# Patient Record
Sex: MALE | Race: WHITE
[De-identification: names, ages, dates, MRNs, and addresses within clinical notes are randomized per-mention and may not be internally consistent; named-entity substitution may affect disease eponyms.]

---

## 2021-04-11 ENCOUNTER — HOSPITAL ENCOUNTER (EMERGENCY)
Dept: HOSPITAL 47 - EC | Age: 66
Discharge: HOME | End: 2021-04-11
Payer: MEDICARE

## 2021-04-11 VITALS — RESPIRATION RATE: 18 BRPM

## 2021-04-11 VITALS — TEMPERATURE: 98.2 F | HEART RATE: 102 BPM | DIASTOLIC BLOOD PRESSURE: 67 MMHG | SYSTOLIC BLOOD PRESSURE: 100 MMHG

## 2021-04-11 DIAGNOSIS — J12.82: ICD-10-CM

## 2021-04-11 DIAGNOSIS — U07.1: Primary | ICD-10-CM

## 2021-04-11 DIAGNOSIS — I48.91: ICD-10-CM

## 2021-04-11 PROCEDURE — 87635 SARS-COV-2 COVID-19 AMP PRB: CPT

## 2021-04-11 PROCEDURE — 99285 EMERGENCY DEPT VISIT HI MDM: CPT

## 2021-04-11 PROCEDURE — 96365 THER/PROPH/DIAG IV INF INIT: CPT

## 2021-04-11 PROCEDURE — 71045 X-RAY EXAM CHEST 1 VIEW: CPT

## 2021-04-11 NOTE — ED
General Adult HPI





<Ceasar Schneider - Last Filed: 04/11/21 18:23>





<Hue Wilkes - Last Filed: 04/11/21 21:18>





- General


Stated complaint: Fever, Covid +


Time Seen by Provider: 04/11/21 18:20





- History of Present Illness


Initial comments: 





Patient was seen as a medical screening for advanced triage purposes: 

65-year-old male presents to the emergency department for COVID symptoms x 5 

days.  Patient reports he has fatigue.  He has also been short of breath.  

Patient states he tested positive at home earlier this week. (Ceasar Schneider)


65-year-old male patient presenting to the emergency department today for fever,

decreased appetite, difficulty sleeping.  States he tested positive for COVID-19

on 04/07/2021.  States his been having symptoms for the last 5 days.  States he 

does have a mild intermittent cough and occasional shortness of breath.  Denies 

any nausea or vomiting.  Denies diarrhea.  Denies any rash.  The past medical 

history significant for atrial fibrillation. Patient denies any recent chest 

pain, abdominal pain, constipation, back pain, numbness, tingling, dizziness, 

hematuria, dysuria, urinary urgency, urinary frequency, headache, visual 

changes, or any other complaints. (Hue Wilkes)





- Related Data


                                    Allergies











Allergy/AdvReac Type Severity Reaction Status Date / Time


 


ampicillin Allergy  Swelling Verified 04/11/21 18:25














Review of Systems


ROS Other: All systems not noted in ROS Statement are negative.





<Ceasar Schneider - Last Filed: 04/11/21 18:23>


ROS Other: All systems not noted in ROS Statement are negative.





<Hue Wilkes - Last Filed: 04/11/21 21:18>


ROS Statement: 


Those systems with pertinent positive or pertinent negative responses have been 

documented in the HPI.








General Exam


General appearance: alert, in no apparent distress, other


Eye exam: Present: normal appearance, PERRL, EOMI.  Absent: scleral icterus, 

conjunctival injection, periorbital swelling


ENT exam: Present: normal exam, normal oropharynx, mucous membranes moist


Respiratory exam: Present: normal lung sounds bilaterally.  Absent: respiratory 

distress, wheezes, rales, rhonchi, stridor


Cardiovascular Exam: Present: normal rhythm, tachycardia, normal heart sounds.  

Absent: systolic murmur, diastolic murmur, rubs, gallop, clicks


GI/Abdominal exam: Present: soft, normal bowel sounds.  Absent: distended, 

tenderness, guarding, rebound, rigid


Neurological exam: Present: alert, oriented X3, CN II-XII intact


Psychiatric exam: Present: normal affect, normal mood


Skin exam: Present: warm, dry, intact, normal color.  Absent: rash





<Hue Wilkes - Last Filed: 04/11/21 21:18>





Course


                                   Vital Signs











  04/11/21 04/11/21





  18:22 20:10


 


Temperature 99.4 F 98.2 F


 


Pulse Rate 121 H 102 H


 


Respiratory 18 18





Rate  


 


Blood Pressure 114/66 100/67


 


O2 Sat by Pulse 95 97





Oximetry  














Medical Decision Making





- Radiology Data


Radiology results: report reviewed, image reviewed





<Hue Wilkes - Last Filed: 04/11/21 21:18>





- Medical Decision Making


65-year-old male patient presents to the emergency department today for 

evaluation after having a positive COVID-19 test at home.  States he was having 

fever, decreased appetite and difficulty sleeping.  Physical examination did 

reveal clear equal lung sounds.  He is in no respiratory distress.  Vital signs 

are satisfactory.  He does qualify to receive bamlanivimab treatment.  We did 

discuss this treatment including risks versus benefits.  He does agree to 

receive this.  Infusion was given, patient monitored for one hour, had no 

complications.  He'll be discharged home to follow-up with his primary care 

physician for recheck in 1-2 days.  Return parameters were discussed in detail. 

They verbalize understanding and agree with this plan.  Case discussed with my 

attending Dr. Gibson.


 (Hue Wilkes)





- Lab Data


                                   Lab Results











  04/11/21 Range/Units





  18:26 


 


Coronavirus (PCR)  Detected A  (Not Detectd)  














- Radiology Data





One view x-ray of the chest is obtained.  Report was reviewed in its entirety.  

Impression by Dr. Carmona shows minimal bibasilar atelectasis versus developing 

infiltrates. (Hue Wilkes)





Disposition





<Ceasar Schneider - Last Filed: 04/11/21 18:23>


Is patient prescribed a controlled substance at d/c from ED?: No


Time of Disposition: 22:05





<Hue Wilkes - Last Filed: 04/11/21 21:18>


Clinical Impression: 


 Pneumonia due to COVID-19 virus





Disposition: HOME SELF-CARE


Condition: Good


Instructions (If sedation given, give patient instructions):  Coronavirus 

Disease 2019 (COVID-19), Viral Pneumonia (ED)


Additional Instructions: 


Rest.  Increase fluids.  Follow-up with the primary care physician for recheck 

in 1-2 days.  Return to the emergency department for any new, worsening, or 

concerning symptoms.


Referrals: 


Win Suarez MD [Primary Care Provider] - 1-2 days

## 2021-04-11 NOTE — XR
EXAMINATION TYPE: XR chest 1V

 

DATE OF EXAM: 4/11/2021

 

COMPARISON: NONE

 

HISTORY: Shortness of breath and fever.

 

TECHNIQUE: Single frontal view of the chest is obtained.

 

FINDINGS:  There is minimal bibasilar hazy opacity. No pleural effusion, or pneumothorax seen.  The c
ardiac silhouette size is within normal limits.   The osseous structures are intact.

 

IMPRESSION:  Minimal bibasilar atelectasis versus developing infiltrates.

## 2021-04-12 ENCOUNTER — HOSPITAL ENCOUNTER (EMERGENCY)
Dept: HOSPITAL 47 - EC | Age: 66
Discharge: HOME | End: 2021-04-12
Payer: MEDICARE

## 2021-04-12 VITALS — HEART RATE: 99 BPM | TEMPERATURE: 99.2 F | RESPIRATION RATE: 19 BRPM

## 2021-04-12 VITALS — DIASTOLIC BLOOD PRESSURE: 66 MMHG | SYSTOLIC BLOOD PRESSURE: 130 MMHG

## 2021-04-12 DIAGNOSIS — Z88.1: ICD-10-CM

## 2021-04-12 DIAGNOSIS — Z79.899: ICD-10-CM

## 2021-04-12 DIAGNOSIS — U07.1: Primary | ICD-10-CM

## 2021-04-12 DIAGNOSIS — Z79.01: ICD-10-CM

## 2021-04-12 DIAGNOSIS — I48.91: ICD-10-CM

## 2021-04-12 LAB
ALBUMIN SERPL-MCNC: 3.9 G/DL (ref 3.5–5)
ALP SERPL-CCNC: 59 U/L (ref 38–126)
ALT SERPL-CCNC: 18 U/L (ref 4–49)
ANION GAP SERPL CALC-SCNC: 10 MMOL/L
APTT BLD: 30.2 SEC (ref 22–30)
AST SERPL-CCNC: 28 U/L (ref 17–59)
BASOPHILS # BLD AUTO: 0 K/UL (ref 0–0.2)
BASOPHILS NFR BLD AUTO: 0 %
BUN SERPL-SCNC: 10 MG/DL (ref 9–20)
CALCIUM SPEC-MCNC: 8.4 MG/DL (ref 8.4–10.2)
CHLORIDE SERPL-SCNC: 100 MMOL/L (ref 98–107)
CO2 SERPL-SCNC: 22 MMOL/L (ref 22–30)
EOSINOPHIL # BLD AUTO: 0 K/UL (ref 0–0.7)
EOSINOPHIL NFR BLD AUTO: 0 %
ERYTHROCYTE [DISTWIDTH] IN BLOOD BY AUTOMATED COUNT: 4.16 M/UL (ref 4.3–5.9)
ERYTHROCYTE [DISTWIDTH] IN BLOOD: 14.1 % (ref 11.5–15.5)
FERRITIN SERPL-MCNC: 424.3 NG/ML (ref 22–322)
GLUCOSE SERPL-MCNC: 164 MG/DL (ref 74–99)
HCT VFR BLD AUTO: 42.8 % (ref 39–53)
HGB BLD-MCNC: 14.1 GM/DL (ref 13–17.5)
INR PPP: 1.6 (ref ?–1.2)
LDH SPEC-CCNC: 592 U/L (ref 313–618)
LYMPHOCYTES # SPEC AUTO: 0.3 K/UL (ref 1–4.8)
LYMPHOCYTES NFR SPEC AUTO: 6 %
MAGNESIUM SPEC-SCNC: 1.6 MG/DL (ref 1.6–2.3)
MCH RBC QN AUTO: 33.9 PG (ref 25–35)
MCHC RBC AUTO-ENTMCNC: 33 G/DL (ref 31–37)
MCV RBC AUTO: 102.7 FL (ref 80–100)
MONOCYTES # BLD AUTO: 0.2 K/UL (ref 0–1)
MONOCYTES NFR BLD AUTO: 3 %
NEUTROPHILS # BLD AUTO: 4.7 K/UL (ref 1.3–7.7)
NEUTROPHILS NFR BLD AUTO: 90 %
PLATELET # BLD AUTO: 186 K/UL (ref 150–450)
POTASSIUM SERPL-SCNC: 4.9 MMOL/L (ref 3.5–5.1)
PROT SERPL-MCNC: 7 G/DL (ref 6.3–8.2)
PT BLD: 15.9 SEC (ref 9–12)
SODIUM SERPL-SCNC: 132 MMOL/L (ref 137–145)
WBC # BLD AUTO: 5.2 K/UL (ref 3.8–10.6)

## 2021-04-12 PROCEDURE — 83735 ASSAY OF MAGNESIUM: CPT

## 2021-04-12 PROCEDURE — 82728 ASSAY OF FERRITIN: CPT

## 2021-04-12 PROCEDURE — 83605 ASSAY OF LACTIC ACID: CPT

## 2021-04-12 PROCEDURE — 86140 C-REACTIVE PROTEIN: CPT

## 2021-04-12 PROCEDURE — 99285 EMERGENCY DEPT VISIT HI MDM: CPT

## 2021-04-12 PROCEDURE — 85610 PROTHROMBIN TIME: CPT

## 2021-04-12 PROCEDURE — 83880 ASSAY OF NATRIURETIC PEPTIDE: CPT

## 2021-04-12 PROCEDURE — 85730 THROMBOPLASTIN TIME PARTIAL: CPT

## 2021-04-12 PROCEDURE — 85025 COMPLETE CBC W/AUTO DIFF WBC: CPT

## 2021-04-12 PROCEDURE — 83615 LACTATE (LD) (LDH) ENZYME: CPT

## 2021-04-12 PROCEDURE — 84145 PROCALCITONIN (PCT): CPT

## 2021-04-12 PROCEDURE — 84484 ASSAY OF TROPONIN QUANT: CPT

## 2021-04-12 PROCEDURE — 71045 X-RAY EXAM CHEST 1 VIEW: CPT

## 2021-04-12 PROCEDURE — 93005 ELECTROCARDIOGRAM TRACING: CPT

## 2021-04-12 PROCEDURE — 36415 COLL VENOUS BLD VENIPUNCTURE: CPT

## 2021-04-12 PROCEDURE — 80053 COMPREHEN METABOLIC PANEL: CPT

## 2021-04-12 PROCEDURE — 94640 AIRWAY INHALATION TREATMENT: CPT

## 2021-04-12 PROCEDURE — 96360 HYDRATION IV INFUSION INIT: CPT

## 2021-04-12 NOTE — XR
EXAMINATION TYPE: XR chest 1V portable

 

DATE OF EXAM: 4/12/2021

 

COMPARISON: Chest x-ray from yesterday

 

HISTORY: Difficulty breathing and cough.

 

TECHNIQUE: Single AP portable frontal upright view of the chest is obtained.

 

FINDINGS:  There is diminished inspiration on current study with new patchy right basilar opacity.  T
he cardiac silhouette size is more prominent and enlarged. Overlying EKG leads current study.   The o
sseous structures are intact.

 

IMPRESSION:  Diminished inspiration with new patchy right basilar atelectasis and/or infiltrate.

## 2021-04-12 NOTE — ED
SOB HPI





- General


Chief Complaint: Shortness of Breath


Stated Complaint: Covid+,SOB


Time Seen by Provider: 04/12/21 06:52


Source: patient


Mode of arrival: ambulatory





- History of Present Illness


Initial Comments: 





66 yo male prsenting for cc of dyspnea. pt states that he tested positive and 

had BAM performed yesterday. HE states that he continues to feel weak, tired and

short of breath. HE states he feels like he cant catch his breath. pt is on 

coumadin, denies leg swelling, chest pain, jaw pain, arm pain, nausea, vomiting,

diarrhea, rashes. Patient has not taken any medications. Patient febrile on 

arrival at 102.2F. He is saturating well on RA. NO distress. He feels warm to 

touch. HR elevated





- Related Data


                                Home Medications











 Medication  Instructions  Recorded  Confirmed


 


Cyclobenzaprine [Flexeril] 5 mg PO BID PRN 04/12/21 04/12/21


 


Folic Acid 1 mg PO HS 04/12/21 04/12/21


 


Metoprolol Tartrate [Lopressor] 25 mg PO HS 04/12/21 04/12/21


 


Omeprazole 20 mg PO HS 04/12/21 04/12/21


 


Warfarin [Coumadin] 5 mg PO HS 04/12/21 04/12/21


 


metHOTREXate sodium [Methotrexate] 10 mg PO WE 04/12/21 04/12/21











                                    Allergies











Allergy/AdvReac Type Severity Reaction Status Date / Time


 


ampicillin Allergy  Swelling Verified 04/12/21 07:55














Review of Systems


ROS Statement: 


Those systems with pertinent positive or pertinent negative responses have been 

documented in the HPI.





ROS Other: All systems not noted in ROS Statement are negative.





Past Medical History


Past Medical History: Atrial Fibrillation, COPD


History of Any Multi-Drug Resistant Organisms: None Reported


Past Surgical History: Appendectomy


Past Psychological History: No Psychological Hx Reported


Smoking Status: Never smoker


Past Alcohol Use History: None Reported, Occasional


Past Drug Use History: Marijuana





General Exam





- General Exam Comments


Initial Comments: 


General:  The patient is awake and alert, in no distress


Eye:  +3 mm pupils are equal, round and reactive to light, extra-ocular 

movements are intact.  No nystagmus.  There is normal conjunctiva bilaterally.  

No signs of icterus.  


Ears, nose, mouth and throat:  There are moist mucous membranes and no oral 

lesions. 


Neck:  The neck is supple, there is no tenderness or JVD.  


Cardiovascular:  There is a regular rate and rhythm. No murmur, rub or gallop is

 appreciated.


Respiratory:  Lungs are clear to auscultation, respirations are non-labored, 

breath sounds are equal.  No wheezes, stridor, rales, or rhonchi.


Gastrointestinal:  Soft, non-distended, non-tender abdomen without masses or 

organomegaly noted. There is no rebound or guarding present.  


Musculoskeletal:  Normal ROM, no tenderness.  Strength 5/5. Sensation intact. 

Radial and DP pulses equal bilaterally 2+.  


Neurological:  A&O x 3. CN II-XII intact grossly, There are no obvious motor or 

sensory deficits. Coordination appears grossly intact. Speech is normal.


Skin:  Skin is warm and dry and no rashes or lesions are noted. No LE edema.


Psychiatric:  Cooperative, appropriate mood & affect, normal judgment.  








Course


                                   Vital Signs











  04/12/21 04/12/21 04/12/21





  06:46 07:01 07:44


 


Temperature 98.7 F 102.2 F H 


 


Pulse Rate 117 H  130 H


 


Respiratory 22  24





Rate   


 


Blood Pressure 117/77  130/66


 


O2 Sat by Pulse 98  97





Oximetry   














  04/12/21 04/12/21 04/12/21





  08:29 08:30 09:46


 


Temperature 100.2 F H  99.2 F


 


Pulse Rate  120 H 99


 


Respiratory  20 19





Rate   


 


Blood Pressure   


 


O2 Sat by Pulse   97





Oximetry   














Medical Decision Making





- Medical Decision Making


Labs stable. HR improved with fver treatment and fluids. pt appears to be 

feeling btter and stated so on reevaluation. patient case discussed by attending

 provider who is agreeable to care plan at discharge at this time.  Patient is 

to return for any worsening shortness of breath chest pain or extremity 

swelling.








- Lab Data


Result diagrams: 


                                 04/12/21 07:10





                                 04/12/21 07:10


                                   Lab Results











  04/12/21 04/12/21 04/12/21 Range/Units





  07:03 07:10 07:10 


 


WBC   5.2   (3.8-10.6)  k/uL


 


RBC   4.16 L   (4.30-5.90)  m/uL


 


Hgb   14.1   (13.0-17.5)  gm/dL


 


Hct   42.8   (39.0-53.0)  %


 


MCV   102.7 H   (80.0-100.0)  fL


 


MCH   33.9   (25.0-35.0)  pg


 


MCHC   33.0   (31.0-37.0)  g/dL


 


RDW   14.1   (11.5-15.5)  %


 


Plt Count   186   (150-450)  k/uL


 


MPV   7.6   


 


Neutrophils %   90   %


 


Lymphocytes %   6   %


 


Monocytes %   3   %


 


Eosinophils %   0   %


 


Basophils %   0   %


 


Neutrophils #   4.7   (1.3-7.7)  k/uL


 


Lymphocytes #   0.3 L   (1.0-4.8)  k/uL


 


Monocytes #   0.2   (0-1.0)  k/uL


 


Eosinophils #   0.0   (0-0.7)  k/uL


 


Basophils #   0.0   (0-0.2)  k/uL


 


Macrocytosis   Slight   


 


PT    15.9 H  (9.0-12.0)  sec


 


INR    1.6 H  (<1.2)  


 


APTT    30.2 H  (22.0-30.0)  sec


 


Sodium     (137-145)  mmol/L


 


Potassium     (3.5-5.1)  mmol/L


 


Chloride     ()  mmol/L


 


Carbon Dioxide     (22-30)  mmol/L


 


Anion Gap     mmol/L


 


BUN     (9-20)  mg/dL


 


Creatinine     (0.66-1.25)  mg/dL


 


Est GFR (CKD-EPI)AfAm     (>60 ml/min/1.73 sqM)  


 


Est GFR (CKD-EPI)NonAf     (>60 ml/min/1.73 sqM)  


 


Glucose     (74-99)  mg/dL


 


Plasma Lactic Acid Wagner     (0.7-2.0)  mmol/L


 


Calcium     (8.4-10.2)  mg/dL


 


Magnesium     (1.6-2.3)  mg/dL


 


Total Bilirubin     (0.2-1.3)  mg/dL


 


AST     (17-59)  U/L


 


ALT     (4-49)  U/L


 


Alkaline Phosphatase     ()  U/L


 


Lactate Dehydrogenase     (313-618)  U/L


 


Troponin I     (0.000-0.034)  ng/mL


 


C-Reactive Protein     (<10.0)  mg/L


 


NT-Pro-B Natriuret Pep  914    pg/mL


 


Total Protein     (6.3-8.2)  g/dL


 


Albumin     (3.5-5.0)  g/dL














  04/12/21 04/12/21 04/12/21 Range/Units





  07:10 07:10 07:10 


 


WBC     (3.8-10.6)  k/uL


 


RBC     (4.30-5.90)  m/uL


 


Hgb     (13.0-17.5)  gm/dL


 


Hct     (39.0-53.0)  %


 


MCV     (80.0-100.0)  fL


 


MCH     (25.0-35.0)  pg


 


MCHC     (31.0-37.0)  g/dL


 


RDW     (11.5-15.5)  %


 


Plt Count     (150-450)  k/uL


 


MPV     


 


Neutrophils %     %


 


Lymphocytes %     %


 


Monocytes %     %


 


Eosinophils %     %


 


Basophils %     %


 


Neutrophils #     (1.3-7.7)  k/uL


 


Lymphocytes #     (1.0-4.8)  k/uL


 


Monocytes #     (0-1.0)  k/uL


 


Eosinophils #     (0-0.7)  k/uL


 


Basophils #     (0-0.2)  k/uL


 


Macrocytosis     


 


PT     (9.0-12.0)  sec


 


INR     (<1.2)  


 


APTT     (22.0-30.0)  sec


 


Sodium  132 L    (137-145)  mmol/L


 


Potassium  4.9    (3.5-5.1)  mmol/L


 


Chloride  100    ()  mmol/L


 


Carbon Dioxide  22    (22-30)  mmol/L


 


Anion Gap  10    mmol/L


 


BUN  10    (9-20)  mg/dL


 


Creatinine  0.91    (0.66-1.25)  mg/dL


 


Est GFR (CKD-EPI)AfAm  >90    (>60 ml/min/1.73 sqM)  


 


Est GFR (CKD-EPI)NonAf  87    (>60 ml/min/1.73 sqM)  


 


Glucose  164 H    (74-99)  mg/dL


 


Plasma Lactic Acid Wagner   1.5   (0.7-2.0)  mmol/L


 


Calcium  8.4    (8.4-10.2)  mg/dL


 


Magnesium  1.6    (1.6-2.3)  mg/dL


 


Total Bilirubin  0.5    (0.2-1.3)  mg/dL


 


AST  28    (17-59)  U/L


 


ALT  18    (4-49)  U/L


 


Alkaline Phosphatase  59    ()  U/L


 


Lactate Dehydrogenase  592    (313-618)  U/L


 


Troponin I    <0.012  (0.000-0.034)  ng/mL


 


C-Reactive Protein  79.8 H    (<10.0)  mg/L


 


NT-Pro-B Natriuret Pep     pg/mL


 


Total Protein  7.0    (6.3-8.2)  g/dL


 


Albumin  3.9    (3.5-5.0)  g/dL














Disposition


Clinical Impression: 


 COVID-19, Fever, Dyspnea





Disposition: HOME SELF-CARE


Condition: Good


Instructions (If sedation given, give patient instructions):  Coronavirus 

Disease 2019 (COVID-19)


Additional Instructions: 


Please use medication as discussed.  Please follow-up with family doctor in the 

next 2 days. continue coumadin.  Please return to emergency room if the symptoms

 increase or worsen or for any other concerns.


Is patient prescribed a controlled substance at d/c from ED?: No


Referrals: 


Win Suarez MD [Primary Care Provider] - 1-2 days


Time of Disposition: 10:02